# Patient Record
Sex: FEMALE | Race: WHITE
[De-identification: names, ages, dates, MRNs, and addresses within clinical notes are randomized per-mention and may not be internally consistent; named-entity substitution may affect disease eponyms.]

---

## 2020-11-26 ENCOUNTER — HOSPITAL ENCOUNTER (EMERGENCY)
Dept: HOSPITAL 62 - ER | Age: 20
Discharge: HOME | End: 2020-11-26
Payer: COMMERCIAL

## 2020-11-26 VITALS — DIASTOLIC BLOOD PRESSURE: 57 MMHG | SYSTOLIC BLOOD PRESSURE: 114 MMHG

## 2020-11-26 DIAGNOSIS — K01.1: Primary | ICD-10-CM

## 2020-11-26 DIAGNOSIS — K05.10: ICD-10-CM

## 2020-11-26 DIAGNOSIS — K08.89: ICD-10-CM

## 2020-11-26 DIAGNOSIS — H92.09: ICD-10-CM

## 2020-11-26 PROCEDURE — 99284 EMERGENCY DEPT VISIT MOD MDM: CPT

## 2020-11-26 NOTE — ER DOCUMENT REPORT
HPI





- HPI


Patient complains to provider of: dental pain


Time Seen by Provider: 11/26/20 19:10


Onset: Yesterday


Onset/Duration: Gradual


Quality of pain: Achy


Pain Level: 4


Context: 





She presents complaining of dental pain for the past 2 days.  Patient reports 

from her wisdom tooth that has caused her to have right-sided facial swelling.  

Patient denies any fever.


Associated Symptoms: Other - Dental pain.  denies: Fever


Exacerbated by: Denies


Relieved by: Denies


Similar symptoms previously: Yes


Recently seen / treated by doctor: No





- ROS


ROS below otherwise negative: Yes


Systems Reviewed and Negative: Yes All other systems reviewed and negative





- CONSTITUTIONAL


Constitutional: DENIES: Fever





- EENT


EENT: REPORTS: Ear Pain


Notes: 





Right lower jaw dental pain





- CARDIOVASCULAR


Cardiovascular: DENIES: Chest pain





- RESPIRATORY


Respiratory: DENIES: Coughing





- GASTROINTESTINAL


Gastrointestinal: DENIES: Nausea





- REPRODUCTIVE


Reproductive: DENIES: Pregnant:





- DERM


Skin Color: Normal


Skin Problems: None





Past Medical History





- General


Information source: Patient





- Social History


Smoking Status: Never Smoker


Frequency of alcohol use: None


Drug Abuse: None


Lives with: Spouse/Significant other


Family History: Reviewed & Not Pertinent





- Medical History


Medical History: Negative


Past Surgical History: Reports: Hx Tonsillectomy





Vertical Provider Document





- CONSTITUTIONAL


Agree With Documented VS: Yes


Exam Limitations: No Limitations


General Appearance: WD/WN, No Apparent Distress





- HEENT


HEENT: Atraumatic, Normocephalic


Mouth Diagram: 


                            __________________________














                            __________________________





 1 - Tenderness, gingival inflammation and swelling, no drainable abscess





Notes: 





Subtle swelling to right cheek, no trismus, no sublingual or submental swelling,

no potential airway compromise





- NECK


Neck: Normal Inspection, Supple.  negative: Lymphadenopathy-Left, 

Lymphadenopathy-Right





- RESPIRATORY


Respiratory: Breath Sounds Normal, No Respiratory Distress





- CARDIOVASCULAR


Cardiovascular: Regular Rate, Regular Rhythm, No Murmur





- MUSCULOSKELETAL/EXTREMETIES


Musculoskeletal/Extremeties: MAEW





- NEURO


Level of Consciousness: Awake, Alert, Appropriate


Motor/Sensory: No Motor Deficit





- DERM


Integumentary: Warm, Dry





Course





- Re-evaluation


Re-evalutation: 





11/26/20 19:23


Patient with impacted wisdom tooth, with gingival swelling, no trismus, no 

potential airway compromise.  Patient encouraged to follow-up with dentist or 

oral surgeon for definitive management.





- Vital Signs


Vital signs: 


                                        











Temp Pulse Resp BP Pulse Ox


 


 98.4 F   75   18   114/57 L  100 


 


 11/26/20 19:08  11/26/20 19:08  11/26/20 19:08  11/26/20 19:08  11/26/20 19:08














Discharge





- Discharge


Clinical Impression: 


 Toothache





Condition: Stable


Disposition: HOME, SELF-CARE


Instructions:  Clindamycin ([facility]), Dentist, Oral Narcotic Medication ([facility]), 

Toothache ([facility])


Additional Instructions: 


Return immediately for any new or worsening symptoms





Followup with your primary care provider, call tomorrow to make a followup 

appointment





Follow-up with a dentist for further management, call Monday for an appointment


Prescriptions: 


Clindamycin HCl 300 mg PO TID #21 capsule


Naproxen [Naprosyn 250 Nmg Tablet] 1 tab PO BID #14 tablet


Referrals: 


[facility] [Provider Group] - Follow up as needed